# Patient Record
Sex: FEMALE | Race: WHITE | Employment: FULL TIME
[De-identification: names, ages, dates, MRNs, and addresses within clinical notes are randomized per-mention and may not be internally consistent; named-entity substitution may affect disease eponyms.]

---

## 2017-04-24 DIAGNOSIS — B00.9 HSV-1 INFECTION: ICD-10-CM

## 2017-04-24 RX ORDER — VALACYCLOVIR HYDROCHLORIDE 1 G/1
2000 TABLET, FILM COATED ORAL 2 TIMES DAILY
Qty: 30 TABLET | Refills: 12 | OUTPATIENT
Start: 2017-04-24

## 2017-04-24 NOTE — TELEPHONE ENCOUNTER
Pending Prescriptions:                       Disp   Refills    valACYclovir (VALTREX) 1000 mg tablet     30 tab*12           Sig: Take 2 tablets (2,000 mg) by mouth 2 times daily    DR. YBARRA APPROVED THIS BUT WANTS PATIENT TO SCHEDULE A FOLLOW-UP APPOINTMENT WITH HER.        Last Written Prescription Date: 08/31/2015  Last Fill Quantity: 30, # refills: 12  Last Office Visit with G, P or [facility] prescribing provider: 09/22/2015        Creatinine   Date Value Ref Range Status   04/27/2011 0.70 0.52 - 1.04 mg/dL Final     Dao KUMAR

## 2017-04-25 NOTE — TELEPHONE ENCOUNTER
Pt calling to check status of refill, given message below, she states she will call back to schedule an appointment.    Nomi Queen   04/25/17

## 2018-04-28 ENCOUNTER — HEALTH MAINTENANCE LETTER (OUTPATIENT)
Age: 28
End: 2018-04-28

## 2019-09-30 ENCOUNTER — HEALTH MAINTENANCE LETTER (OUTPATIENT)
Age: 29
End: 2019-09-30

## 2021-01-15 ENCOUNTER — HEALTH MAINTENANCE LETTER (OUTPATIENT)
Age: 31
End: 2021-01-15

## 2021-10-24 ENCOUNTER — HEALTH MAINTENANCE LETTER (OUTPATIENT)
Age: 31
End: 2021-10-24

## 2022-02-13 ENCOUNTER — HEALTH MAINTENANCE LETTER (OUTPATIENT)
Age: 32
End: 2022-02-13

## 2022-10-16 ENCOUNTER — HEALTH MAINTENANCE LETTER (OUTPATIENT)
Age: 32
End: 2022-10-16

## 2023-03-26 ENCOUNTER — HEALTH MAINTENANCE LETTER (OUTPATIENT)
Age: 33
End: 2023-03-26